# Patient Record
Sex: MALE | Race: BLACK OR AFRICAN AMERICAN | NOT HISPANIC OR LATINO | Employment: OTHER | URBAN - METROPOLITAN AREA
[De-identification: names, ages, dates, MRNs, and addresses within clinical notes are randomized per-mention and may not be internally consistent; named-entity substitution may affect disease eponyms.]

---

## 2018-01-15 NOTE — MISCELLANEOUS
Message   Recorded as Task   Date: 03/15/2016 09:27 AM, Created By: Marylin Matthwe   Task Name: Follow Up   Assigned To: Ninette Hodgkin   Regarding Patient: Kavitha Mendez, Status: In Progress   Comment:    Magda Sandoval - 15 Mar 2016 9:27 AM     TASK CREATED  Pt's mother called and would like to know the results of urine tests Dr Marshall Babcock ordered  I see in the system that it was resulted as an Epic Provider so I don't think it was tasked to him to verify  Mothers name is Don and her number [phone]   Ninette Hodgkin - 15 Mar 2016 10:23 AM     TASK REASSIGNED: Previously Assigned To Ninette Hodgkin  Results in chart   Heraclio Medel - 16 Mar 2016 12:22 PM     TASK REPLIED TO: Previously Assigned To Khoi Mcdonald  I have called patient's mother Don and left a message  Sana please call her again - results of 24 hour urine creatinine clearance is normal as well as repeat serum creatinine  Recommend to repeat a BMP and urinalysis in 3 months and if stays normal, then follow up with PCP (please mail orders)  Bong Antonio - 16 Mar 2016 2:41 PM     TASK EDITED  l/m for patient to return call   Sana Bauamn - 16 Mar 2016 3:13 PM     TASK IN PROGRESS       I spoke to the patient's mother and she is aware of above  AG      Active Problems    1  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 (844 2)   2  Elevated serum creatinine (790 99) (R79 89)   3  Neck Strain (847 0)    Current Meds   1  No Reported Medications Recorded    Allergies    1  No Known Drug Allergies    Plan  Elevated serum creatinine    · (1) BASIC METABOLIC PROFILE; Status:Active - Retrospective By Protocol  Authorization; Requested [id number];    · (1) URINALYSIS (will reflex a microscopy if leukocytes, occult blood, protein or nitrites  are not within normal limits); Status:Active - Retrospective By Protocol Authorization;   Requested [facility]:[id number];     Signatures   Electronically signed by : LALO Small ; Mar 17 2016  2:54PM EST

## 2018-01-17 NOTE — MISCELLANEOUS
Message   Recorded as Task   Date: 02/24/2016 10:38 PM, Created By: Janette Borja   Task Name: Miscellaneous   Assigned To: Stew Riggins   Regarding Patient: Deepak Suarez, Status: Active   [employer] - 24 Feb 2016 10:38 PM     TASK CREATED  Please contact lab to make sure they are doing 24 hour urine creatinine clearance too as I have ordered on my office visit  Its very important  thanks,    Osiris Vargas - 25 Feb 2016 9:22 AM     TASK REPLIED TO: Previously Assigned To [employer] states it is still in process   Heraclio Medel - 29 Feb 2016 8:03 AM     TASK REPLIED TO: Previously Assigned To Heraclio Medel  I see the creatinine clearance result but there is NOT resut of the creatinine clearance, they just wrote down the other values but not the final result  Please call lab and see what is going on  Blaine Flood - 29 Feb 2016 10:34 AM     TASK REPLIED TO: Previously Assigned To Sana Bauman  They couldn't calculate the creatinine clearance because you didn't order a serum creatinine to go with the test  So the lab could not calculate  Heraclio Medel - 01 Mar 2016 1:27 PM     TASK REPLIED TO: Previously Assigned To Heraclio Medel  I have spoken with lab supervisor, they have contacted patient to arrange a new 24 hour urine collection and this time they will drawn serum creatinine too  ThanksJEMAL        Active Problems    1  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

## 2018-03-05 ENCOUNTER — TRANSCRIBE ORDERS (OUTPATIENT)
Dept: LAB | Facility: CLINIC | Age: 23
End: 2018-03-05

## 2018-03-05 ENCOUNTER — APPOINTMENT (OUTPATIENT)
Dept: LAB | Facility: CLINIC | Age: 23
End: 2018-03-05
Payer: COMMERCIAL

## 2018-03-05 DIAGNOSIS — Z11.59 SCREENING EXAMINATION FOR POLIOMYELITIS: ICD-10-CM

## 2018-03-05 DIAGNOSIS — R79.1 ABNORMAL COAGULATION PROFILE: Primary | ICD-10-CM

## 2018-03-05 DIAGNOSIS — Z00.00 ROUTINE GENERAL MEDICAL EXAMINATION AT A HEALTH CARE FACILITY: ICD-10-CM

## 2018-03-05 LAB
APTT PPP: 34 SECONDS (ref 23–35)
BASOPHILS # BLD AUTO: 0.02 THOUSANDS/ΜL (ref 0–0.1)
BASOPHILS NFR BLD AUTO: 1 % (ref 0–1)
EOSINOPHIL # BLD AUTO: 0.03 THOUSAND/ΜL (ref 0–0.61)
EOSINOPHIL NFR BLD AUTO: 1 % (ref 0–6)
ERYTHROCYTE [DISTWIDTH] IN BLOOD BY AUTOMATED COUNT: 12.1 % (ref 11.6–15.1)
HCT VFR BLD AUTO: 45.9 % (ref 36.5–49.3)
HGB BLD-MCNC: 15.5 G/DL (ref 12–17)
INR PPP: 1 (ref 0.86–1.16)
LYMPHOCYTES # BLD AUTO: 1.35 THOUSANDS/ΜL (ref 0.6–4.47)
LYMPHOCYTES NFR BLD AUTO: 50 % (ref 14–44)
MCH RBC QN AUTO: 30.9 PG (ref 26.8–34.3)
MCHC RBC AUTO-ENTMCNC: 33.8 G/DL (ref 31.4–37.4)
MCV RBC AUTO: 92 FL (ref 82–98)
MONOCYTES # BLD AUTO: 0.48 THOUSAND/ΜL (ref 0.17–1.22)
MONOCYTES NFR BLD AUTO: 18 % (ref 4–12)
NEUTROPHILS # BLD AUTO: 0.81 THOUSANDS/ΜL (ref 1.85–7.62)
NEUTS SEG NFR BLD AUTO: 30 % (ref 43–75)
NRBC BLD AUTO-RTO: 0 /100 WBCS
PLATELET # BLD AUTO: 287 THOUSANDS/UL (ref 149–390)
PMV BLD AUTO: 10.1 FL (ref 8.9–12.7)
PROTHROMBIN TIME: 13.2 SECONDS (ref 12.1–14.4)
RBC # BLD AUTO: 5.01 MILLION/UL (ref 3.88–5.62)
WBC # BLD AUTO: 2.69 THOUSAND/UL (ref 4.31–10.16)

## 2018-03-05 PROCEDURE — 87340 HEPATITIS B SURFACE AG IA: CPT

## 2018-03-05 PROCEDURE — 85610 PROTHROMBIN TIME: CPT

## 2018-03-05 PROCEDURE — 36415 COLL VENOUS BLD VENIPUNCTURE: CPT

## 2018-03-05 PROCEDURE — 86803 HEPATITIS C AB TEST: CPT

## 2018-03-05 PROCEDURE — 87389 HIV-1 AG W/HIV-1&-2 AB AG IA: CPT

## 2018-03-05 PROCEDURE — 85730 THROMBOPLASTIN TIME PARTIAL: CPT

## 2018-03-05 PROCEDURE — 80307 DRUG TEST PRSMV CHEM ANLYZR: CPT

## 2018-03-05 PROCEDURE — 80349 CANNABINOIDS NATURAL: CPT

## 2018-03-05 PROCEDURE — 85025 COMPLETE CBC W/AUTO DIFF WBC: CPT

## 2018-03-06 LAB
HBV SURFACE AG SER QL: NORMAL
HCV AB SER QL: NORMAL
HIV 1+2 AB+HIV1 P24 AG SERPL QL IA: NORMAL

## 2018-03-22 LAB
11OH-THC SPEC-MCNC: NEGATIVE NG/ML
AMPHETAMINES SERPL QL SCN: NEGATIVE NG/ML
BARBITURATES SERPL QL SCN: NEGATIVE UG/ML
BENZODIAZ SPEC QL: NEGATIVE NG/ML
CANNABIDIOL SERPLBLD CFM-MCNC: NEGATIVE NG/ML
CANNABIDIOL SERPLBLD CFM-MCNC: NEGATIVE NG/ML
CANNABINOIDS SERPL QL SCN: ABNORMAL NG/ML
CARBOXYTHC BLD-MCNC: 3.1 NG/ML
COCAINE+BZE SERPL QL SCN: NEGATIVE NG/ML
OPIATES SERPL QL SCN: NEGATIVE NG/ML
OXYCODONE+OXYMORPHONE SERPLBLD QL SCN: NEGATIVE NG/ML
PCP SERPL QL SCN: NEGATIVE NG/ML
SL AMB CANNABINOID CONFIRMATION: POSITIVE
THC SERPLBLD CFM-MCNC: NEGATIVE NG/ML

## 2018-05-30 ENCOUNTER — TRANSCRIBE ORDERS (OUTPATIENT)
Dept: ADMINISTRATIVE | Facility: HOSPITAL | Age: 23
End: 2018-05-30

## 2018-05-30 ENCOUNTER — HOSPITAL ENCOUNTER (OUTPATIENT)
Dept: RADIOLOGY | Facility: HOSPITAL | Age: 23
Discharge: HOME | End: 2018-05-30
Payer: COMMERCIAL

## 2018-05-30 DIAGNOSIS — M79.644 PAIN OF FINGER OF RIGHT HAND: Primary | ICD-10-CM

## 2018-05-30 DIAGNOSIS — M79.671 PAIN IN BOTH FEET: ICD-10-CM

## 2018-05-30 DIAGNOSIS — M79.672 PAIN IN BOTH FEET: ICD-10-CM

## 2018-05-30 DIAGNOSIS — M79.644 PAIN OF FINGER OF RIGHT HAND: ICD-10-CM

## 2018-05-30 PROCEDURE — 73600 X-RAY EXAM OF ANKLE: CPT

## 2018-05-30 PROCEDURE — 73140 X-RAY EXAM OF FINGER(S): CPT

## 2018-05-30 PROCEDURE — 73620 X-RAY EXAM OF FOOT: CPT

## 2019-06-19 ENCOUNTER — HOSPITAL ENCOUNTER (OUTPATIENT)
Dept: RADIOLOGY | Facility: HOSPITAL | Age: 24
Discharge: HOME | End: 2019-06-19
Payer: COMMERCIAL

## 2019-06-19 ENCOUNTER — TRANSCRIBE ORDERS (OUTPATIENT)
Dept: ADMINISTRATIVE | Facility: HOSPITAL | Age: 24
End: 2019-06-19

## 2019-06-19 DIAGNOSIS — M22.41 CHONDROMALACIA OF RIGHT PATELLA: Primary | ICD-10-CM

## 2019-06-19 DIAGNOSIS — M22.41 CHONDROMALACIA OF RIGHT PATELLA: ICD-10-CM

## 2019-06-19 PROCEDURE — 73590 X-RAY EXAM OF LOWER LEG: CPT

## 2020-08-03 ENCOUNTER — HOSPITAL ENCOUNTER (OUTPATIENT)
Dept: RADIOLOGY | Facility: HOSPITAL | Age: 25
Discharge: HOME | End: 2020-08-03
Payer: COMMERCIAL

## 2020-08-03 VITALS
HEART RATE: 75 BPM | BODY MASS INDEX: 23.23 KG/M2 | SYSTOLIC BLOOD PRESSURE: 106 MMHG | WEIGHT: 162.26 LBS | HEIGHT: 70 IN | DIASTOLIC BLOOD PRESSURE: 67 MMHG

## 2020-08-03 DIAGNOSIS — M25.551 PAIN IN RIGHT HIP: Primary | ICD-10-CM

## 2020-08-03 DIAGNOSIS — M25.551 PAIN IN RIGHT HIP: ICD-10-CM

## 2020-08-03 DIAGNOSIS — M54.16 LUMBAR NEURITIS: ICD-10-CM

## 2020-08-03 PROCEDURE — 73502 X-RAY EXAM HIP UNI 2-3 VIEWS: CPT

## 2020-08-03 PROCEDURE — 99203 OFFICE O/P NEW LOW 30 MIN: CPT | Performed by: PHYSICIAN ASSISTANT

## 2020-08-03 RX ORDER — METHYLPREDNISOLONE 4 MG/1
TABLET ORAL
Qty: 1 EACH | Refills: 0 | Status: SHIPPED | OUTPATIENT
Start: 2020-08-03

## 2020-08-03 NOTE — PROGRESS NOTES
Assessment/Plan   Diagnoses and all orders for this visit:        Lumbar neuritis  -     Ambulatory referral to Physical Therapy; Future  -     Ambulatory referral to [facility]   Patient ID: Malcom Wayne is a 26 y o  male  Vitals:    08/03/20 1209   BP: 106/67   Pulse: 76     26yo male comes in for an evaluation of his right leg  For the last few months, he has been having tightness in his back and numbness in the posterior aspect of the leg and plantar foot  This numbness is up and down in severity  It tends to increase with sitting  More recently, he feels as if his leg wants to externally rotate at the hip  He is not having any pain in the leg and is only having "tightness" in the back  No clicking or catching  No specific injury  The following portions of the patient's history were reviewed and updated as appropriate: allergies, current medications, past family history, past medical history, past social history, past surgical history and problem list     Review of Systems  Ortho Exam  No past medical history on file  No past surgical history on file  No family history on file  Social History     Occupational History    Not on file   Tobacco Use    Smoking status: Never Smoker   Substance and Sexual Activity    Alcohol use: No    Drug use: No    Sexual activity: Not on file         Objective   Physical Exam    · Constitutional: Awake, Alert, Oriented  · Eyes: EOMI  · Psych: Mood and affect appropriate  · Heart: regular rate and rhythm  · Lungs: No audible wheezing  · Abdomen: soft  · Lymph: no lymphedema     right Low back / lumbar spine:  - Appearance   Inspection of back is normal with normal lordosis and no scoliosis, erythema, ecchymosis, or rash    - Palpation   No tenderness of the midline bony landmarks, paraspinal musculature, or SI joints bilaterally  - ROM   Full and pain-free active ROM, flexion 100, extension +30, rotation 90/90, horizontal flexion 50/50   Right hip flexion 100, ER 60, IR 40, ABD 60  - Motor   abductor 5/5; quadraceps 5/5; hamstrings 5/5; adductors 5/5; iliopsoas 5/5, anterior tibial 5/5; gastrosoleus 5/5; EHL 5/5; peroneal posterior tibial 5/5; EHL 5/5  - Sensation   + numbness: right posterior thigh, plantar foot, all 5 toes  - DTRs   No clonus and DTRs 1+ and symmetric bilaterally  - Special Tests   SLR negative    I have personally reviewed pertinent films in PACS and my interpretation is normal hip xray

## 2020-10-23 ENCOUNTER — HOSPITAL ENCOUNTER (OUTPATIENT)
Dept: RADIOLOGY | Facility: HOSPITAL | Age: 25
Discharge: HOME | End: 2020-10-23
Payer: COMMERCIAL

## 2020-10-23 ENCOUNTER — TRANSCRIBE ORDERS (OUTPATIENT)
Dept: ADMINISTRATIVE | Facility: HOSPITAL | Age: 25
End: 2020-10-23

## 2020-10-23 DIAGNOSIS — M79.642 LEFT HAND PAIN: ICD-10-CM

## 2020-10-23 DIAGNOSIS — M79.642 LEFT HAND PAIN: Primary | ICD-10-CM

## 2020-10-23 PROCEDURE — 73130 X-RAY EXAM OF HAND: CPT

## 2021-06-02 NOTE — PROGRESS NOTES
1  Clicking left shoulder  MRI arthrogram left shoulder    FL injection left shoulder (arthrogram)    CANCELED: XR shoulder 2+ vw left   2  Acute pain of left shoulder  XR shoulder 2+ vw left    MRI arthrogram left shoulder    FL injection left shoulder (arthrogram)    CANCELED: XR shoulder 2+ vw left     Orders Placed This Encounter   Procedures    XR shoulder 2+ vw left    MRI arthrogram left shoulder    FL injection left shoulder (arthrogram)          ASSESSMENT/PLAN:  Discussed clinical findings with France Knight today  Given his high physical demand and findings concerning for posterior labral tear, I am requesting an MRI to elucidate the extend of his injury  He will follow-up after the study is officially read  Return for  follow-up after left shoulder MRI arthrogram   Repeat XR Next Visit: No     _____________________________________________________________  CHIEF COMPLAINT:  New patient left shoulder pain     HPI:  Dariel Torres II is a 23 y o  male who presents as a new patient for acute on chronic left shoulder pain  Most recent started May 7th  "It feels like it slips out"  Had a boxing match May 7th  He feels it frequently pops in and out of joint  Can be as simple as when he takes his shirt off and it will feel like it pops out of socket  Never needed reduced by an ATC  Thinks it may have started during highschool football  Has tried Medrol which seemed to help some  This was prescribed by a doctor at [facility]  Describes pain as an intermittent pain described as a "bruise"  Starting to occur more frequently  Will occurs weekly  Pain is associated with clicking, sense of instability  Review of Systems   Constitutional: Negative for chills, fever and unexpected weight change  HENT: Negative for hearing loss, nosebleeds and sore throat  Eyes: Negative for pain, redness and visual disturbance  Respiratory: Negative for cough, shortness of breath and wheezing      Cardiovascular: Negative for chest pain, palpitations and leg swelling  Gastrointestinal: Negative for abdominal pain, nausea and vomiting  Endocrine: Negative for polydipsia and polyuria  Genitourinary: Negative for dysuria and hematuria  Skin: Negative for rash and wound  Neurological: Negative for dizziness, numbness and headaches  Psychiatric/Behavioral: Negative for decreased concentration, dysphoric mood and suicidal ideas  The patient is not nervous/anxious  Following history reviewed and update:    History reviewed  No pertinent past medical history  History reviewed  No pertinent surgical history  Social History   Social History     Substance and Sexual Activity   Alcohol Use No     Social History     Substance and Sexual Activity   Drug Use No     Social History     Tobacco Use   Smoking Status Never Smoker   Smokeless Tobacco Never Used     History reviewed  No pertinent family history  No Known Allergies       Physical Exam  /68 (BP Location: Right arm, Patient Position: Sitting, Cuff Size: Standard)   Pulse 65   Ht 5' 10" (1 778 m)   Wt 74 3 kg (163 lb 12 8 oz)   BMI 23 50 kg/m²     Constitutional:  see vital signs  Gen: well-developed, normocephalic/atraumatic, well-groomed  Eyes: No inflammation or discharge of conjunctiva or lids; sclera clear   Pharynx: no inflammation, lesion, or mass of lips  Neck: supple, no masses, non-distended  MSK: no inflammation, lesion, mass, or clubbing of nails and digits except for other than mentioned below  SKIN: no visible rashes or skin lesions  Pulmonary/Chest: Effort normal  No respiratory distress     NEURO: cranial nerves grossly intact  PSYCH:  Alert and oriented to person, place, and time; recent and remote memory intact; mood normal, no depression, anxiety, or agitation, judgment and insight good and intact     Ortho Exam  LEFT SHOULDER:  Erythema: no  Swelling: no  Increased Warmth: no    Tenderness: TTP over posterior shoulder capsule    ROM  Touchdown sign: intact  External Rotation: intact  Internal Rotation: intact    Strength  Abduction: 5/5  ER: 5/5  IR: 5/5    Drop-Arm: negative  Emptycan: negative  Belly Press: negative   Lift-off Test: negative     Jameson: positive   Neer: negative  Cross-Arm: negative   Speed: +    Labral Crank Test : +  (abducted 90, axial load, guided IR & Er)    Labral shift and grind: +    Pomona's Test: -  (FF 90, abd 15, resist thumbs up-, resist thumbs down+)    Apprehension: equivocal   Katy's Relocation Maneuver: equivocal     Palpable clicking on passive movement of the shoulder  RIGHT SHOULDER:  Strength  Abduction: 5/5  ER: 5/5  IR: 5/5    ROM  Touchdown sign: intact    Empty can: negative      Portions of the record may have been created with voice recognition software  Occasional wrong word or "sound alike" substitutions may have occurred due to the inherent limitations of voice recognition software  Please review the chart carefully and recognize, using context, where substitutions/typographical errors may have occurred

## 2021-06-03 ENCOUNTER — OFFICE VISIT (OUTPATIENT)
Dept: OBGYN CLINIC | Facility: CLINIC | Age: 26
End: 2021-06-03
Payer: COMMERCIAL

## 2021-06-03 ENCOUNTER — APPOINTMENT (OUTPATIENT)
Dept: RADIOLOGY | Facility: AMBULARY SURGERY CENTER | Age: 26
End: 2021-06-03
Payer: COMMERCIAL

## 2021-06-03 VITALS
WEIGHT: 163.8 LBS | DIASTOLIC BLOOD PRESSURE: 68 MMHG | SYSTOLIC BLOOD PRESSURE: 111 MMHG | HEIGHT: 70 IN | HEART RATE: 65 BPM | BODY MASS INDEX: 23.45 KG/M2

## 2021-06-03 DIAGNOSIS — M25.512 ACUTE PAIN OF LEFT SHOULDER: ICD-10-CM

## 2021-06-03 DIAGNOSIS — M25.812 CLICKING LEFT SHOULDER: Primary | ICD-10-CM

## 2021-06-03 PROCEDURE — 73030 X-RAY EXAM OF SHOULDER: CPT

## 2021-06-03 PROCEDURE — 99214 OFFICE O/P EST MOD 30 MIN: CPT | Performed by: ORTHOPAEDIC SURGERY

## 2022-01-06 ENCOUNTER — EVALUATION (OUTPATIENT)
Dept: PHYSICAL THERAPY | Facility: CLINIC | Age: 27
End: 2022-01-06
Payer: COMMERCIAL

## 2022-01-06 DIAGNOSIS — G89.29 CHRONIC LEFT SHOULDER PAIN: Primary | ICD-10-CM

## 2022-01-06 DIAGNOSIS — M25.512 CHRONIC LEFT SHOULDER PAIN: Primary | ICD-10-CM

## 2022-01-06 PROCEDURE — 97110 THERAPEUTIC EXERCISES: CPT | Performed by: PHYSICAL THERAPIST

## 2022-01-06 PROCEDURE — 97140 MANUAL THERAPY 1/> REGIONS: CPT | Performed by: PHYSICAL THERAPIST

## 2022-01-06 PROCEDURE — 97161 PT EVAL LOW COMPLEX 20 MIN: CPT | Performed by: PHYSICAL THERAPIST
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
## 2022-01-19 ENCOUNTER — APPOINTMENT (OUTPATIENT)
Dept: PHYSICAL THERAPY | Facility: CLINIC | Age: 27
End: 2022-01-19
Payer: COMMERCIAL

## 2022-01-19 ENCOUNTER — OFFICE VISIT (OUTPATIENT)
Dept: PHYSICAL THERAPY | Facility: CLINIC | Age: 27
End: 2022-01-19
Payer: COMMERCIAL

## 2022-01-19 DIAGNOSIS — G89.29 CHRONIC LEFT SHOULDER PAIN: Primary | ICD-10-CM

## 2022-01-19 DIAGNOSIS — M25.512 CHRONIC LEFT SHOULDER PAIN: Primary | ICD-10-CM

## 2022-01-19 PROCEDURE — 97140 MANUAL THERAPY 1/> REGIONS: CPT

## 2022-01-19 PROCEDURE — 97112 NEUROMUSCULAR REEDUCATION: CPT

## 2022-01-19 NOTE — PROGRESS NOTES
Daily Note     Today's date: 2022  Patient name: Raymond Martinez  : 1995  MRN: [id number]  Referring provider: Marylin Gandara DO  Dx:   Encounter Diagnosis     ICD-10-CM    1  Chronic left shoulder pain  M25 512     G89 29        Start Time: 1100  Stop Time: 1138  Total time in clinic (min): 38 minutes    Subjective: Pt reports compliance with HEP  He notes he get muscle burning and feels weak in his left shoulder  Objective: See treatment diary below      Assessment: Tolerated treatment well  Able to progress with strengthening  Mild increase in pain with RS, that resolves with rest   Patient demonstrated fatigue post treatment      Plan: Continue per plan of care  Precautions: Nil  (Dynamic stability progression for increased RC stability/activation)    Manuals            AP Mobs in ER PROM             PROM/STM focus ER  6'            RS in multi Plane  2'           Progress RS in Striking Form (deflection)    This visit? Neuro Re-Ed             UE WS 3" x 10 3"x10           Prone ITY 3" x 20 1# 3"x20 Pball progression?            Prone Row/Retraction 3" x 20 1# 3"x20           SA Punches             SL ER, Flex, Abd L  2# 2x10           Tband Row + Ext  Blue  2x10           Tband ER + IR  GTB  2x10           Ball on [employer]                                                   T Drill trial?   This visit                                                              Ther Activity                                       Gait Training                                       Modalities             CP to R shoulder

## 2022-01-24 ENCOUNTER — APPOINTMENT (OUTPATIENT)
Dept: PHYSICAL THERAPY | Facility: CLINIC | Age: 27
End: 2022-01-24
Payer: COMMERCIAL

## 2022-01-25 ENCOUNTER — OFFICE VISIT (OUTPATIENT)
Dept: PHYSICAL THERAPY | Facility: CLINIC | Age: 27
End: 2022-01-25
Payer: COMMERCIAL

## 2022-01-25 DIAGNOSIS — G89.29 CHRONIC LEFT SHOULDER PAIN: Primary | ICD-10-CM

## 2022-01-25 DIAGNOSIS — M25.512 CHRONIC LEFT SHOULDER PAIN: Primary | ICD-10-CM

## 2022-01-25 PROCEDURE — 97140 MANUAL THERAPY 1/> REGIONS: CPT

## 2022-01-25 PROCEDURE — 97112 NEUROMUSCULAR REEDUCATION: CPT

## 2022-01-25 NOTE — PROGRESS NOTES
Daily Note     Today's date: 2022  Patient name: Zaida Stubbs  : 1995  MRN: [id number]  Referring provider: Danita Johnson DO  Dx:   Encounter Diagnosis     ICD-10-CM    1  Chronic left shoulder pain  M25 512     G89 29        Start Time: 0800  Stop Time: 0840  Total time in clinic (min): 40 minutes    Subjective: Pt reports that he still has soreness with reaching and when he was boxing but he felt good following last visit  Objective: See treatment diary below      Assessment: Tolerated treatment well  Full pain free ER passively  He is still painful with RS  Able to progress with stability and strength  Patient demonstrated fatigue post treatment      Plan: Continue per plan of care        Precautions: Nil  (Dynamic stability progression for increased RC stability/activation)    Manuals           AP Mobs in ER PROM             PROM/STM focus ER  6'  6'          Swim drill    nv          RS in multi Plane  2' 2'          Progress RS in Striking Form (deflection)    TS          Neuro Re-Ed             UE WS 3" x 10 3"x10 3"x10          Prone ITY 3" x 20 1# 3"x20 Pball progression    2x10            Prone Row/Retraction 3" x 20 1# 3"x20 1# 20           SA Punches   2# 2x10          SL ER, Flex, Abd L  2# 2x10 2# 3x10           Tband Row + Ext  Blue  2x10 Blue 2x10           Tband ER + IR  GTB  2x10 Blue 2x10          Ball on [employer] 20  Yellow 20                                     Body blade various angles    nv         T Drill trial?    This visit                                                             Ther Activity                                       Gait Training                                       Modalities             CP to R shoulder    decline

## 2022-01-26 ENCOUNTER — APPOINTMENT (OUTPATIENT)
Dept: PHYSICAL THERAPY | Facility: CLINIC | Age: 27
End: 2022-01-26
Payer: COMMERCIAL

## 2022-01-27 ENCOUNTER — OFFICE VISIT (OUTPATIENT)
Dept: PHYSICAL THERAPY | Facility: CLINIC | Age: 27
End: 2022-01-27
Payer: COMMERCIAL

## 2022-01-27 DIAGNOSIS — M25.512 CHRONIC LEFT SHOULDER PAIN: Primary | ICD-10-CM

## 2022-01-27 DIAGNOSIS — G89.29 CHRONIC LEFT SHOULDER PAIN: Primary | ICD-10-CM

## 2022-01-27 PROCEDURE — 97140 MANUAL THERAPY 1/> REGIONS: CPT | Performed by: PHYSICAL THERAPIST

## 2022-01-27 PROCEDURE — 97112 NEUROMUSCULAR REEDUCATION: CPT | Performed by: PHYSICAL THERAPIST

## 2022-01-27 NOTE — PROGRESS NOTES
Daily Note     Today's date: 2022  Patient name: Jens Izquierdo  : 1995  MRN: [id number]  Referring provider: Sanaz John DO  Dx:   Encounter Diagnosis     ICD-10-CM    1  Chronic left shoulder pain  M25 512     G89 29                   Subjective: Pt presents today stating that he did clinching drills and striking yesterday without major complaint  Objective: See treatment diary below      Assessment: Proceeded with further strengthening, reps, as well as sport specific clinch and striking drills without challenge  Offered strategies and exercise to develop shoulder strength for pt when he is in clinch and doing close range striking  Continue to progress as able  Plan: Continue per plan of care  Precautions: Nil  (Dynamic stability progression for increased RC stability/activation)    Manuals          AP Mobs in ER PROM             PROM/STM focus ER  6'  6' 5 min         Swim drill/clinching   nv 3 min         RS in multi Plane  2' 2'          Progress RS in Striking Form (deflection)    TS 2 min         Neuro Re-Ed             UE WS 3" x 10 3"x10 3"x10 3" x 10         Prone ITY 3" x 20 1# 3"x20 Pball progression    2x10   Pball 3# 2 x 10         Prone Row/Retraction 3" x 20 1# 3"x20 1# 20  3# 20x         SA Punches   2# 2x10 3# 2 x 20         SL ER, Flex, Abd L  2# 2x10 2# 3x10  3# 2 x 20         Tband Row + Ext  Blue  2x10 Blue 2x10  Blue 2 x 20         Tband ER + IR  GTB  2x10 Blue 2x10 Blue 2 x 20          Ball on [employer] 20  Yellow 20  yellow 20x                      Tband Wall Walk     GTB        Body blade various angles    2 x 30" at 90 and str8            T Drill trial?    This visit - nv                                                              Ther Activity                                       Gait Training                                       Modalities             CP to R shoulder    decline

## 2022-01-31 ENCOUNTER — APPOINTMENT (OUTPATIENT)
Dept: PHYSICAL THERAPY | Facility: CLINIC | Age: 27
End: 2022-01-31
Payer: COMMERCIAL

## 2022-02-01 ENCOUNTER — APPOINTMENT (OUTPATIENT)
Dept: PHYSICAL THERAPY | Facility: CLINIC | Age: 27
End: 2022-02-01

## 2022-02-02 ENCOUNTER — APPOINTMENT (OUTPATIENT)
Dept: PHYSICAL THERAPY | Facility: CLINIC | Age: 27
End: 2022-02-02

## 2022-02-03 ENCOUNTER — OFFICE VISIT (OUTPATIENT)
Dept: PHYSICAL THERAPY | Facility: CLINIC | Age: 27
End: 2022-02-03

## 2022-02-03 DIAGNOSIS — M25.512 CHRONIC LEFT SHOULDER PAIN: Primary | ICD-10-CM

## 2022-02-03 DIAGNOSIS — G89.29 CHRONIC LEFT SHOULDER PAIN: Primary | ICD-10-CM

## 2022-02-03 PROCEDURE — 97110 THERAPEUTIC EXERCISES: CPT

## 2022-02-03 PROCEDURE — 97140 MANUAL THERAPY 1/> REGIONS: CPT | Performed by: PHYSICAL THERAPIST

## 2022-02-03 NOTE — PROGRESS NOTES
Daily Note     Today's date: 2/3/2022  Patient name: Isidoro Harrison  : 1995  MRN: [id number]  Referring provider: Juvenal Jaffe DO  Dx:   Encounter Diagnosis     ICD-10-CM    1  Chronic left shoulder pain  M25 512     G89 29        Start Time: 0900  Stop Time: 0940  Total time in clinic (min): 40 minutes    Subjective: Pt reports that he was wrestling and did some movement and then felt "paralyzing" with pain  He notes a lot of soreness this morning  He believes that before this he was doing much better  Objective: See treatment diary below      Assessment: Pt tolerated session well  Decreased reps to better tolerance today  Held some interventions due to pain, will resume NV  Plan: Continue per plan of care  Precautions: Nil  (Dynamic stability progression for increased RC stability/activation)    Manuals         AP Mobs in ER PROM             PROM/STM focus ER  6'  6' 5 min nv        Swim drill/clinching   nv 3 min TS        RS in multi Plane  2' 2'          Progress RS in Striking Form (deflection)    TS 2 min TS        Neuro Re-Ed             UE WS 3" x 10 3"x10 3"x10 3" x 10 3"x10        Prone ITY 3" x 20 1# 3"x20 Pball progression    2x10   Pball 3# 2 x 10 3# 2x10        Prone Row/Retraction 3" x 20 1# 3"x20 1# 20  3# 20x 3# 20        SA Punches   2# 2x10 3# 2 x 20 3# 20        SL ER, Flex, Abd L  2# 2x10 2# 3x10  3# 2 x 20 3# 20 ABD only, ER p! Tband Row + Ext  Blue  2x10 Blue 2x10  Blue 2 x 20 Blue 2x20         Tband ER + IR  GTB  2x10 Blue 2x10 Blue 2 x 20  Blue 2x20         Ball on [employer]  Green 20  Yellow 20  yellow 20x Yellow 20                     Tband Wall Walk     GTB        Body blade various angles    2 x 30" at 90 and str8   nv?        T Drill trial?    This visit - nv  nv?                                                             Ther Activity                                       Gait Training Modalities             CP to R shoulder    decline

## 2022-02-07 ENCOUNTER — APPOINTMENT (OUTPATIENT)
Dept: PHYSICAL THERAPY | Facility: CLINIC | Age: 27
End: 2022-02-07

## 2022-02-09 ENCOUNTER — APPOINTMENT (OUTPATIENT)
Dept: PHYSICAL THERAPY | Facility: CLINIC | Age: 27
End: 2022-02-09

## 2022-02-10 ENCOUNTER — OFFICE VISIT (OUTPATIENT)
Dept: PHYSICAL THERAPY | Facility: CLINIC | Age: 27
End: 2022-02-10

## 2022-02-10 DIAGNOSIS — M25.512 CHRONIC LEFT SHOULDER PAIN: Primary | ICD-10-CM

## 2022-02-10 DIAGNOSIS — G89.29 CHRONIC LEFT SHOULDER PAIN: Primary | ICD-10-CM

## 2022-02-10 PROCEDURE — 97112 NEUROMUSCULAR REEDUCATION: CPT | Performed by: PHYSICAL THERAPIST

## 2022-02-10 PROCEDURE — 97140 MANUAL THERAPY 1/> REGIONS: CPT | Performed by: PHYSICAL THERAPIST

## 2022-02-10 NOTE — PROGRESS NOTES
Daily Note     Today's date: 2/10/2022  Patient name: Tony Morel  : 1995  MRN: [id number]  Referring provider: Connie Beltrán DO  Dx:   Encounter Diagnosis     ICD-10-CM    1  Chronic left shoulder pain  M25 512     G89 29                   Subjective: Pt presents today stating that he is still having mild pain with don/doff clothing, elevated pain with his training in striking and clinching, but overall better then when he started, he feels stronger, faster, and more confident than when beginning PT intervention  Objective: See treatment diary below      Assessment:  Proceeded with CKC based intervention and while he had soreness, he is progressing very well with stability, movement and unpredictable positioning of L UE    RE nv   Plan: Continue per plan of care  Precautions: Nil  (Dynamic stability progression for increased RC stability/activation)    Manuals  2/10       AP Mobs in ER PROM             PROM/STM focus ER  6'  6' 5 min nv        Swim drill/clinching   nv 3 min TS TS       RS in multi Plane  2' 2'          Progress RS in Striking Form (deflection)    TS 2 min TS TS       Neuro Re-Ed             UE WS 3" x 10 3"x10 3"x10 3" x 10 3"x10 review       Prone ITY 3" x 20 1# 3"x20 Pball progression    2x10   Pball 3# 2 x 10 3# 2x10 3# 2  X 10       Prone Row/Retraction 3" x 20 1# 3"x20 1# 20  3# 20x 3# 20 3# 20       SA Punches   2# 2x10 3# 2 x 20 3# 20 3# 20       SL ER, Flex, Abd L  2# 2x10 2# 3x10  3# 2 x 20 3# 20 ABD only, ER p! 3# 20        Tband Row + Ext  Blue  2x10 Blue 2x10  Blue 2 x 20 Blue 2x20  Blue TB  2 x 10       Tband ER + IR  GTB  2x10 Blue 2x10 Blue 2 x 20  Blue 2x20  Blue 2 x 10       Ball on [employer] 20  Yellow 20  yellow 20x Yellow 20 Yellow 20x                    Tband Wall Walk     GTB GTB 6 laps        Body blade various angles    2 x 30" at 90 and str8   nv?        T Drill trial?    This visit - nv  nv? 12 - 9 x 5  Ther Activity                                       Gait Training                                       Modalities             CP to R shoulder    decline

## 2022-02-14 ENCOUNTER — APPOINTMENT (OUTPATIENT)
Dept: PHYSICAL THERAPY | Facility: CLINIC | Age: 27
End: 2022-02-14

## 2022-02-16 ENCOUNTER — APPOINTMENT (OUTPATIENT)
Dept: PHYSICAL THERAPY | Facility: CLINIC | Age: 27
End: 2022-02-16

## 2022-02-17 ENCOUNTER — EVALUATION (OUTPATIENT)
Dept: PHYSICAL THERAPY | Facility: CLINIC | Age: 27
End: 2022-02-17

## 2022-02-17 DIAGNOSIS — M25.512 CHRONIC LEFT SHOULDER PAIN: Primary | ICD-10-CM

## 2022-02-17 DIAGNOSIS — G89.29 CHRONIC LEFT SHOULDER PAIN: Primary | ICD-10-CM

## 2022-02-17 PROCEDURE — 97140 MANUAL THERAPY 1/> REGIONS: CPT | Performed by: PHYSICAL THERAPIST

## 2022-02-17 NOTE — PROGRESS NOTES
PT Evaluation     Today's date: 2022  Patient name: Jimmy Au  : 1995  MRN: [id number]  Referring provider: Chip Vega DO  Dx:   Encounter Diagnosis     ICD-10-CM    1  Chronic left shoulder pain  M25 512     G89 29                   Assessment  Assessment details: Pt at this time has made strong gains in range, strength, flexibility, tolerance to activity as well as pain levels at worse  Pt at this time demonstrates excellent independence with HEP and is likely safe to DC to HEP as he has achieved all goals sought out for him as well as by him  If he is to have a decline in any form he is welcome to return, discussion about DA for his b/l foot pain and this will performed on 3/8/2022    Thank you very much for this kind and motivated referral       Impairments: activity intolerance, impaired physical strength, lacks appropriate home exercise program, pain with function, poor posture  and poor body mechanics  Understanding of Dx/Px/POC: good   Prognosis: good    Goals  STG 4 Weeks:  Decrease pain at worst to 6 -met   Improve range to improve abd by 10 AROM -met  Improve strength to Mid low trap 4-, gross 4- -met  Independent with HEP -met  LTG 8 Weeks:  Decrease pain at worst to 3 -partial  Improve range to have abd equal to that of R -met  Improve strength to mid low trap 4, gross 5    -met  Able to perform all desired activities with minimal to nil symptom exacerbation -met      Plan  Patient would benefit from: skilled physical therapy  Planned modality interventions: cryotherapy, TENS and thermotherapy: hydrocollator packs  Planned therapy interventions: joint mobilization, manual therapy, abdominal trunk stabilization, neuromuscular re-education, activity modification, behavior modification, patient education, postural training, strengthening, stretching, therapeutic activities, therapeutic exercise, therapeutic training, home exercise program, graded motor, graded exercise, graded activity, gait training, functional ROM exercises and flexibility  Frequency: 2x week  Duration in weeks: 10  Treatment plan discussed with: patient        Subjective Evaluation    History of Present Illness  Date of onset: 2022  Mechanism of injury: Pt presents today stating that he is feeling better  He states that pain at worst during training and fighting exhibition has been 4/10 at worst   He states that the only time he is really having pain is during initial pushing or punch exercises, but shortly after beginning he feels as though it goes away  Pt reports Pt denies trouble with sleeping, recreational exercise and performing other tasks around the home  Pt reports that he is feeling very content with his progression and would like to be independent with HEP at this time and if he has a decline in any form he will be content to reach out as needed  Quality of life: good    Pain  Current pain ratin  At best pain ratin  At worst pain ratin  Quality: dull ache, sharp, pressure and radiating  Relieving factors: medications, rest, relaxation, ice and heat  Exacerbated by: striking, accidental movements     Progression: worsening      Diagnostic Tests  MRI studies: abnormal  Treatments  Previous treatment: medication  Patient Goals  Patient goals for therapy: increased strength, return to sport/leisure activities, increased motion and decreased pain          Objective     Active Range of Motion   Left Shoulder   Flexion: 170 (PROM WFL) degrees with pain  Abduction: 170 (PROM WFL) degrees   External rotation BTH: T3 (PROM WFL)   Internal rotation BTB: T10 (PROM WFL) with pain    Right Shoulder   Flexion: 170 (PROM WFL) degrees   Abduction: 165 (PROM WFL) degrees   External rotation BTH: T2 (PROM WFL)   Internal rotation BTB: T10 (PROM WFL)     Additional Active Range of Motion Details  Forward head, rounded shoulders  B/L Sensation intact to light touch C3,4,5,6,7,8,T1,T2  Shoulder Strength  L Flex 5 Abd 5 ER 5 IR 5  R Flex 5 Abd 5 ER 5 IR 5  Mid Low Trap B 4  UE screen strong and painless   strong and painless   CS Screen WFL mild restriction in Retract + Ext  Joint Mobility Grade 5 L, Grade 4 R  Palpation pain at lateral inferior and posterior aspects of shoulder  STs  L Sulcus +3 fingers, (-) HK, + Empty, + Anterior Slide, + Crank, + Empty  (-) Full, Horn blower at add and 90 (-)  R Sulcus + 2 fingers               Precautions: Nil  (Dynamic stability progression for increased RC stability/activation)    Manuals 1/6 01/19 01/25 1/27 02/03 2/10 2/17      AP Mobs in ER PROM       Evaluation x 25 mins      PROM/STM focus ER  6'  6' 5 min nv        Swim drill/clinching   nv 3 min TS TS       RS in multi Plane  2' 2'          Progress RS in Striking Form (deflection)    TS 2 min TS TS       Neuro Re-Ed             UE WS 3" x 10 3"x10 3"x10 3" x 10 3"x10 review       Prone ITY 3" x 20 1# 3"x20 Pball progression    2x10   Pball 3# 2 x 10 3# 2x10 3# 2  X 10       Prone Row/Retraction 3" x 20 1# 3"x20 1# 20  3# 20x 3# 20 3# 20       SA Punches   2# 2x10 3# 2 x 20 3# 20 3# 20       SL ER, Flex, Abd L  2# 2x10 2# 3x10  3# 2 x 20 3# 20 ABD only, ER p! 3# 20        Tband Row + Ext  Blue  2x10 Blue 2x10  Blue 2 x 20 Blue 2x20  Blue TB  2 x 10       Tband ER + IR  GTB  2x10 Blue 2x10 Blue 2 x 20  Blue 2x20  Blue 2 x 10       Ball on [employer] 20  Yellow 20  yellow 20x Yellow 20 Yellow 20x                    Tband Wall Walk     GTB GTB 6 laps        Body blade various angles    2 x 30" at 90 and str8   nv?        T Drill trial?    This visit - nv  nv? 12 - 9 x 5                                                               Ther Activity                                       Gait Training                                       Modalities             CP to R shoulder    decline

## 2022-02-17 NOTE — LETTER
2022    Natalie Trejo DO  [facility]    Patient: Mikayla Rod II   YOB: 1995   Date of Visit: 2022     Encounter Diagnosis     ICD-10-CM    1  Chronic left shoulder pain  M25 512     G89 29        Dear Dr Renee Gunn:    Thank you for your recent referral of [address] Please review the attached evaluation summary from Dave's recent visit  Please verify that you agree with the plan of care by signing the attached order  If you have any questions or concerns, please do not hesitate to call  I sincerely appreciate the opportunity to share in the care of one of your patients and hope to have another opportunity to work with you in the near future  Sincerely,    Vida Feldman, PT      Referring Provider:      I certify that I have read the below Plan of Care and certify the need for these services furnished under this plan of treatment while under my care  Natalie Trejo DO  [address] [address]  Via Fax: [phone]          PT Evaluation     Today's date: 2022  Patient name: [address]  : 1995  MRN: [id number]  Referring provider: Judy Mayen DO  Dx:   Encounter Diagnosis     ICD-10-CM    1  Chronic left shoulder pain  M25 512     G89 29                   Assessment  Assessment details: Pt at this time has made strong gains in range, strength, flexibility, tolerance to activity as well as pain levels at worse  Pt at this time demonstrates excellent independence with HEP and is likely safe to DC to HEP as he has achieved all goals sought out for him as well as by him  If he is to have a decline in any form he is welcome to return, discussion about DA for his b/l foot pain and this will performed on 3/8/2022    Thank you very much for this kind and motivated referral       Impairments: activity intolerance, impaired physical strength, lacks appropriate home exercise program, pain with function, poor posture  and poor body mechanics  Understanding of Dx/Px/POC: good   Prognosis: good    Goals  STG 4 Weeks:  Decrease pain at worst to 6 -met   Improve range to improve abd by 10 AROM -met  Improve strength to Mid low trap 4-, gross 4- -met  Independent with HEP -met  LTG 8 Weeks:  Decrease pain at worst to 3 -partial  Improve range to have abd equal to that of R -met  Improve strength to mid low trap 4, gross 5    -met  Able to perform all desired activities with minimal to nil symptom exacerbation -met      Plan  Patient would benefit from: skilled physical therapy  Planned modality interventions: cryotherapy, TENS and thermotherapy: hydrocollator packs  Planned therapy interventions: joint mobilization, manual therapy, abdominal trunk stabilization, neuromuscular re-education, activity modification, behavior modification, patient education, postural training, strengthening, stretching, therapeutic activities, therapeutic exercise, therapeutic training, home exercise program, graded motor, graded exercise, graded activity, gait training, functional ROM exercises and flexibility  Frequency: 2x week  Duration in weeks: 10  Treatment plan discussed with: patient        Subjective Evaluation    History of Present Illness  Date of onset: 1/6/2022  Mechanism of injury: Pt presents today stating that he is feeling better  He states that pain at worst during training and fighting exhibition has been 4/10 at worst   He states that the only time he is really having pain is during initial pushing or punch exercises, but shortly after beginning he feels as though it goes away  Pt reports Pt denies trouble with sleeping, recreational exercise and performing other tasks around the home  Pt reports that he is feeling very content with his progression and would like to be independent with HEP at this time and if he has a decline in any form he will be content to reach out as needed      Quality of life: good    Pain  Current pain ratin  At best pain ratin  At worst pain ratin  Quality: dull ache, sharp, pressure and radiating  Relieving factors: medications, rest, relaxation, ice and heat  Exacerbated by: striking, accidental movements  Progression: worsening      Diagnostic Tests  MRI studies: abnormal  Treatments  Previous treatment: medication  Patient Goals  Patient goals for therapy: increased strength, return to sport/leisure activities, increased motion and decreased pain          Objective     Active Range of Motion   Left Shoulder   Flexion: 170 (PROM WFL) degrees with pain  Abduction: 170 (PROM WFL) degrees   External rotation BTH: T3 (PROM WFL)   Internal rotation BTB: T10 (PROM WFL) with pain    Right Shoulder   Flexion: 170 (PROM WFL) degrees   Abduction: 165 (PROM WFL) degrees   External rotation BTH: T2 (PROM WFL)   Internal rotation BTB: T10 (PROM WFL)     Additional Active Range of Motion Details  Forward head, rounded shoulders  B/L Sensation intact to light touch C3,4,5,6,7,8,T1,T2  Shoulder Strength  L Flex 5 Abd 5 ER 5 IR 5  R Flex 5 Abd 5 ER 5 IR 5  Mid Low Trap B 4  UE screen strong and painless   strong and painless   CS Screen WFL mild restriction in Retract + Ext  Joint Mobility Grade 5 L, Grade 4 R  Palpation pain at lateral inferior and posterior aspects of shoulder  STs  L Sulcus +3 fingers, (-) HK, + Empty, + Anterior Slide, + Crank, + Empty    (-) Full, Horn blower at add and 90 (-)  R Sulcus + 2 fingers               Precautions: Nil  (Dynamic stability progression for increased RC stability/activation)    Manuals 1/6 01/19 01/25 1/27 02/03 2/10 2/17      AP Mobs in ER PROM       Evaluation x 25 mins      PROM/STM focus ER  6'  6' 5 min nv        Swim drill/clinching   nv 3 min TS TS       RS in multi Plane  2' 2'          Progress RS in Striking Form (deflection)    TS 2 min TS TS       Neuro Re-Ed             UE WS 3" x 10 3"x10 3"x10 3" x 10 3"x10 review       Prone ITY 3" x 20 1# 3"x20 Pball progression    2x10   Pball 3# 2 x 10 3# 2x10 3# 2  X 10       Prone Row/Retraction 3" x 20 1# 3"x20 1# 20  3# 20x 3# 20 3# 20       SA Punches   2# 2x10 3# 2 x 20 3# 20 3# 20       SL ER, Flex, Abd L  2# 2x10 2# 3x10  3# 2 x 20 3# 20 ABD only, ER p! 3# 20        Tband Row + Ext  Blue  2x10 Blue 2x10  Blue 2 x 20 Blue 2x20  Blue TB  2 x 10       Tband ER + IR  GTB  2x10 Blue 2x10 Blue 2 x 20  Blue 2x20  Blue 2 x 10       Ball on [employer] 20  Yellow 20  yellow 20x Yellow 20 Yellow 20x                    Tband Wall Walk     GTB GTB 6 laps        Body blade various angles    2 x 30" at 90 and str8   nv?        T Drill trial?    This visit - nv  nv? 12 - 9 x 5                                                               Ther Activity                                       Gait Training                                       Modalities             CP to R shoulder    decline

## 2022-02-22 ENCOUNTER — APPOINTMENT (OUTPATIENT)
Dept: PHYSICAL THERAPY | Facility: CLINIC | Age: 27
End: 2022-02-22

## 2022-03-31 NOTE — PROGRESS NOTES
PT Discharge    Today's date: 3/31/2022  Patient name: Robin Dalton  : 1995  MRN: [id number]  Referring provider: Zunilda Angela DO  Dx:   Encounter Diagnosis     ICD-10-CM    1  Chronic left shoulder pain  M25 512     G89 29        Start Time: 164  Stop Time:   Total time in clinic (min): 30 minutes    Assessment/Plan  Pt has not been present since 2022  Pt's chart will be DC in compliance of facility policy as all Charts are DC within 30 days of last scheduled visit          Subjective    Objective    Flowsheet Rows      Most Recent Value   PT/OT G-Codes    Current Score 73   Projected Score 76

## 2022-12-28 NOTE — RESULT NOTES
Verified Results  (1) CREATININE CLEARANCE, URINE 24HR [id number] 09:01AM Zenaida Snowden    Order Number: [id number]    SERUM CREATININE WAS NOT ORDERED OR DRAWN, UNABLE TO CALCULATE 24HR CREATININE CLEARANCE     Test Name Result Flag Reference   CREATININE URINE 291 0 mg/dL     CREATININE CLEARANCE mL/min  80  00   CREATININE 24 HOUR UR 2 3 g/24Hr H 0 8-1 8   TOTAL URINE VOLUME 800 ml Assessment/Education  Information obtained from: Review of pt current medical record, interview with pt, his spouse and his daughter at his bedside